# Patient Record
Sex: MALE | Employment: FULL TIME | URBAN - METROPOLITAN AREA
[De-identification: names, ages, dates, MRNs, and addresses within clinical notes are randomized per-mention and may not be internally consistent; named-entity substitution may affect disease eponyms.]

---

## 2020-03-30 ENCOUNTER — TELEPHONE (OUTPATIENT)
Dept: FAMILY MEDICINE CLINIC | Facility: CLINIC | Age: 55
End: 2020-03-30

## 2020-03-30 NOTE — TELEPHONE ENCOUNTER
I called and spoke with the wife and she seemed panicked. She is concerned that he had symptoms of COVID 19 a couple weeks ago. I asked if patient was home and if I could talk with him. She put him on the phone.        No cough  Low grade fever 17th and 18

## 2020-03-30 NOTE — TELEPHONE ENCOUNTER
Patient's wife stated that patient told her he felt light headed and achy since March 17. She stated that Dr. Mehreen Nesbitt was his doctor.   She consented to tele visit

## 2024-03-13 ENCOUNTER — OFFICE VISIT (OUTPATIENT)
Dept: FAMILY MEDICINE CLINIC | Facility: CLINIC | Age: 59
End: 2024-03-13
Payer: COMMERCIAL

## 2024-03-13 VITALS
HEIGHT: 65 IN | WEIGHT: 132 LBS | SYSTOLIC BLOOD PRESSURE: 92 MMHG | BODY MASS INDEX: 21.99 KG/M2 | HEART RATE: 55 BPM | RESPIRATION RATE: 18 BRPM | OXYGEN SATURATION: 99 % | DIASTOLIC BLOOD PRESSURE: 60 MMHG | TEMPERATURE: 97 F

## 2024-03-13 DIAGNOSIS — Z00.00 ENCOUNTER FOR ANNUAL PHYSICAL EXAM: Primary | ICD-10-CM

## 2024-03-13 DIAGNOSIS — Z12.11 SCREENING FOR COLON CANCER: ICD-10-CM

## 2024-03-13 PROCEDURE — 99386 PREV VISIT NEW AGE 40-64: CPT | Performed by: FAMILY MEDICINE

## 2024-03-13 RX ORDER — DIPHENOXYLATE HYDROCHLORIDE AND ATROPINE SULFATE 2.5; .025 MG/1; MG/1
1 TABLET ORAL
COMMUNITY

## 2024-03-13 NOTE — PROGRESS NOTES
BP 92/60   Pulse 55   Temp 97.2 °F (36.2 °C) (Temporal)   Resp 18   Ht 5' 5\" (1.651 m)   Wt 132 lb (59.9 kg)   SpO2 99%   BMI 21.97 kg/m²  Body mass index is 21.97 kg/m².     Chief Complaint   Patient presents with    [facility]    Physical       Camilla Lanza is a 58 year old male who presents for a complete physical exam.   HPI:   Otherwise healthy male who is here today for annual physical  He was last seen in this office more than 10 years ago  He said that since then he never saw anybody  Denies any complaints  Review of system is negative  Patient does not smoke no alcohol or drug use      Wt Readings from Last 4 Encounters:   03/13/24 132 lb (59.9 kg)     Body mass index is 21.97 kg/m².   BP Readings from Last 3 Encounters:   03/13/24 92/60      Current Outpatient Medications   Medication Sig Dispense Refill    Multiple Vitamin (THERA/BETA-CAROTENE) Oral Tab Take 1 tablet by mouth.        History reviewed. No pertinent past medical history.   History reviewed. No pertinent surgical history.   History reviewed. No pertinent family history.   Social History:  Social History     Socioeconomic History    Marital status:    Tobacco Use    Smoking status: Never    Smokeless tobacco: Never   Vaping Use    Vaping Use: Never used   Substance and Sexual Activity    Alcohol use: Not Currently    Drug use: Never      Exercise: none.  Diet: doesn't watch     REVIEW OF SYSTEMS:   GENERAL HEALTH: feels well otherwise, denies fever  SKIN: denies any unusual skin lesions or rashes  EYES: no visual complaints or deficits  HEENT: denies nasal congestion, sinus pain or sore throat; hearing loss negative  RESPIRATORY: denies shortness of breath, wheezing or cough  CARDIOVASCULAR: denies chest pain or LAYNE; no palpitations  GI: denies nausea, vomiting, constipation, diarrhea; no rectal bleeding; no heartburn  :  (Male):denies nocturia or changes in stream  MUSCULOSKELETAL: no joint complaints upper or lower  extremities  NEURO: no sensory or motor complaint  PSYCHE: no symptoms of depression or anxiety  HEMATOLOGY: denies h/o anemia; denies bruising or excessive bleeding  ENDOCRINE: denies excessive thirst or urination; denies unexpected wt gain or wt loss  ALLERGY/IMM.: denies food or seasonal allergies    EXAM:   BP 92/60   Pulse 55   Temp 97.2 °F (36.2 °C) (Temporal)   Resp 18   Ht 5' 5\" (1.651 m)   Wt 132 lb (59.9 kg)   SpO2 99%   BMI 21.97 kg/m²  Body mass index is 21.97 kg/m².   GENERAL: well developed, well nourished,in no apparent distress  SKIN: no rashes,no suspicious lesions  HEENT: atraumatic, normocephalic,ears and throat are clear  EYES:PERRLA, EOMI,conjunctiva are clear  NECK: supple,no adenopathy,no bruits  CHEST: no chest tenderness  LUNGS: clear to auscultation  CARDIO: RRR without murmur  GI: good BS's,no masses, HSM or tenderness  : Deferred  RECTAL:Deferred  MUSCULOSKELETAL: back is not tender,FROM of the back  EXTREMITIES: no cyanosis, clubbing or edema  NEURO: Oriented times three,cranial nerves are intact,motor and sensory are grossly intact    ASSESSMENT AND PLAN:   :Camilla was seen today for establish care and physical.    Diagnoses and all orders for this visit:    Encounter for annual physical exam  Camilla Lanza is a 58 year old male who presents for a complete physical exam.   Pt's weight is Body mass index is 21.97 kg/m².,   Eat a heart-healthy diet. Include potassium and fiber, and drink plenty of water.   Patient is advised to lose weight,   Exercise regularly --- Dietary measures discussed include diet about 1800 calories - Excercise regimen also reviewed as well as long term benefits on overall health.   Recommend at least 30 minutes a day 3-4 times a week of aerobic activity such as brisk walking, cycling, aerobics, or swimming.  Anerobic activities also encouraged for overall toning and strength/endurance building  Pt info given for: exercise, low fat diet,  Fasting labs  ordered  Immunizations reviewed, patient refused all vaccines  Depression screen negative  Screening colonoscopy ordered  Diet and exercise counseling given  -     CBC With Differential With Platelet  -     Comp Metabolic Panel (14)  -     Lipid Panel  -     TSH and Free T4  -     PSA Total, Diagnostic    Screening for colon cancer  -     Surgery Referral - In Network      Camilla Lanza is a 58 year old male who presents for a complete physical exam.   Pt's weight is Body mass index is 21.97 kg/m².,   Eat a heart-healthy diet. Include potassium and fiber, and drink plenty of water.   Patient is advised to lose weight,   Exercise regularly --- Dietary measures discussed include diet about 1800 calories - Excercise regimen also reviewed as well as long term benefits on overall health.   Recommend at least 30 minutes a day 3-4 times a week of aerobic activity such as brisk walking, cycling, aerobics, or swimming.  Anerobic activities also encouraged for overall toning and strength/endurance building  Pt info given for: exercise, low fat diet,  The patient indicates understanding of these issues and agrees to the plan.  .      No follow-ups on file.        Angus Holman MD, 3/13/2024, 1:12 PM      This dictation was performed with a verbal recognition program (DRAGON) and it was checked for errors. It is possible that there are still dictated errors within this office note. If so, please bring any errors to my attention for an addendum. All efforts were made to ensure that this office note is accurate